# Patient Record
Sex: FEMALE | Race: WHITE | NOT HISPANIC OR LATINO | URBAN - METROPOLITAN AREA
[De-identification: names, ages, dates, MRNs, and addresses within clinical notes are randomized per-mention and may not be internally consistent; named-entity substitution may affect disease eponyms.]

---

## 2019-11-10 ENCOUNTER — EMERGENCY (EMERGENCY)
Facility: HOSPITAL | Age: 64
LOS: 0 days | Discharge: HOME | End: 2019-11-10
Attending: EMERGENCY MEDICINE
Payer: COMMERCIAL

## 2019-11-10 VITALS
TEMPERATURE: 98 F | RESPIRATION RATE: 18 BRPM | DIASTOLIC BLOOD PRESSURE: 79 MMHG | HEART RATE: 66 BPM | OXYGEN SATURATION: 98 % | SYSTOLIC BLOOD PRESSURE: 121 MMHG

## 2019-11-10 VITALS — WEIGHT: 139.99 LBS | HEIGHT: 65 IN

## 2019-11-10 DIAGNOSIS — L03.011 CELLULITIS OF RIGHT FINGER: ICD-10-CM

## 2019-11-10 PROCEDURE — 99284 EMERGENCY DEPT VISIT MOD MDM: CPT

## 2019-11-10 PROCEDURE — 99283 EMERGENCY DEPT VISIT LOW MDM: CPT

## 2019-11-10 RX ORDER — AZTREONAM 2 G
1 VIAL (EA) INJECTION
Qty: 14 | Refills: 0
Start: 2019-11-10 | End: 2019-11-16

## 2019-11-10 NOTE — ED ADULT TRIAGE NOTE - CHIEF COMPLAINT QUOTE
Pt had right index finger paper cut x 7 days ago now has streaking , sent from urgent care to r/o lymphangitis or cellulitis

## 2019-11-10 NOTE — ED STATDOCS - CLINICAL SUMMARY MEDICAL DECISION MAKING FREE TEXT BOX
Will give dose of abx here. Will give dose of abx here and dc home on abx.  strict return precautions discussed.  if not improving or if worsening return to ER

## 2019-11-10 NOTE — ED STATDOCS - SKIN, MLM
+redness and swelling to distal left index finger with healing superficial lac laterally, very faint red extension extending up arm consistent with cellulitis

## 2019-11-10 NOTE — ED STATDOCS - PATIENT PORTAL LINK FT
You can access the FollowMyHealth Patient Portal offered by [facility] by registering at the following website: [email or web address]. By joining [facility]’s FollowMyHealth portal, you will also be able to view your health information using other applications (apps) compatible with our system.

## 2019-11-10 NOTE — ED STATDOCS - ATTENDING CONTRIBUTION TO CARE
I, Keke Enriquez MD,  performed the initial face to face bedside interview with this patient regarding history of present illness, review of symptoms and relevant past medical, social and family history.  I completed an independent physical examination.  I was the initial provider who evaluated this patient. I have signed out the follow up of any pending tests (i.e. labs, radiological studies) to the ACP.  I have communicated the patient’s plan of care and disposition with the ACP.  The history, relevant review of systems, past medical and surgical history, medical decision making, and physical examination was documented by the scribe in my presence and I attest to the accuracy of the documentation.

## 2019-11-10 NOTE — ED STATDOCS - OBJECTIVE STATEMENT
64 y/o female with no pertinent PMHx presents to the ED c/o left index finger redness and swelling. Aching. Pt was sent from urgent care to r/o lymphangitis or cellulitis. Pt sustained initially a paper cut to finger x7 days which has worsened. Redness has persisted. Denies fever. No PMD at this time.

## 2021-01-03 ENCOUNTER — OUTPATIENT (OUTPATIENT)
Dept: OUTPATIENT SERVICES | Facility: HOSPITAL | Age: 66
LOS: 1 days | End: 2021-01-03
Payer: COMMERCIAL

## 2021-01-03 DIAGNOSIS — Z20.828 CONTACT WITH AND (SUSPECTED) EXPOSURE TO OTHER VIRAL COMMUNICABLE DISEASES: ICD-10-CM

## 2021-01-03 PROCEDURE — C9803: CPT

## 2021-01-03 PROCEDURE — U0005: CPT

## 2021-01-03 PROCEDURE — U0003: CPT

## 2021-01-04 DIAGNOSIS — Z20.828 CONTACT WITH AND (SUSPECTED) EXPOSURE TO OTHER VIRAL COMMUNICABLE DISEASES: ICD-10-CM

## 2021-01-04 PROBLEM — Z78.9 OTHER SPECIFIED HEALTH STATUS: Chronic | Status: ACTIVE | Noted: 2019-11-11

## 2021-01-04 LAB — SARS-COV-2 RNA SPEC QL NAA+PROBE: SIGNIFICANT CHANGE UP
